# Patient Record
(demographics unavailable — no encounter records)

---

## 2024-10-15 NOTE — ASSESSMENT
[FreeTextEntry1] : 38-year-old female with symptomatic reticular veins.  Pedal pulses are intact bilaterally.  There is no evidence of significant arterial sufficiency at this time.  In the office today, patient underwent duplex which demonstrates venous insufficiency in the deep system of bilateral lower extremities with reflux also noted in the proximal thigh segment of the left greater saphenous vein.  There is no evidence of DVT or SVT in the lower extremities.  Recommend compression and elevation.  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  Patient to consider sclerotherapy.  Patient understands this treatment is considered cosmetic and there is an out of pocket cost.  Estimate 5 syringes total, 3 of 1% Asclera and 2 of 0.5 % Asclera.

## 2024-10-15 NOTE — PHYSICAL EXAM
[Normal Breath Sounds] : Normal breath sounds [Normal Rate and Rhythm] : normal rate and rhythm [2+] : left 2+ [No Rash or Lesion] : No rash or lesion [Alert] : alert [Calm] : calm [Varicose Veins Of Lower Extremities] : not present [] : not present [de-identified] : Appears well, no acute distress noted [FreeTextEntry1] : Telangiectasias and reticular veins in bilateral lower extremities [de-identified] : No palpable cords.  No calf tenderness. [de-identified] : Intact

## 2024-10-15 NOTE — HISTORY OF PRESENT ILLNESS
[FreeTextEntry1] : Patient is a 38-year-old female with history significant for hypothyroidism who presents to the office today for evaluation of bilateral lower extremity varicose veins.  Patient reports discomfort in the veins.  Denies fever or chills.  Denies rest pain or claudication symptoms.  Denies tissue loss or bleeding varicosities.  No history of MI or CVA.  No history of DVT or PE.  No history of smoking.